# Patient Record
Sex: MALE | Race: BLACK OR AFRICAN AMERICAN | Employment: FULL TIME | URBAN - METROPOLITAN AREA
[De-identification: names, ages, dates, MRNs, and addresses within clinical notes are randomized per-mention and may not be internally consistent; named-entity substitution may affect disease eponyms.]

---

## 2023-04-21 ENCOUNTER — HOSPITAL ENCOUNTER (EMERGENCY)
Age: 63
Discharge: HOME | End: 2023-04-21
Attending: STUDENT IN AN ORGANIZED HEALTH CARE EDUCATION/TRAINING PROGRAM

## 2023-04-21 VITALS
TEMPERATURE: 98.1 F | RESPIRATION RATE: 16 BRPM | SYSTOLIC BLOOD PRESSURE: 145 MMHG | OXYGEN SATURATION: 100 % | HEART RATE: 103 BPM | DIASTOLIC BLOOD PRESSURE: 83 MMHG

## 2023-04-21 RX ORDER — ONDANSETRON 2 MG/ML
INJECTION INTRAMUSCULAR; INTRAVENOUS
Status: DISCONTINUED
Start: 2023-04-21 | End: 2023-04-21 | Stop reason: HOSPADM

## 2023-04-21 NOTE — ED TRIAGE NOTES
Pt arrives to the ED [id number] with a c/c of abd pain associated with vomiting \"blood. \" Pt states he was evaluated in ED for same symptoms yesterday and sent home. Pt is noted in stable condition and in no acute distress.